# Patient Record
Sex: FEMALE | Race: WHITE | NOT HISPANIC OR LATINO | URBAN - METROPOLITAN AREA
[De-identification: names, ages, dates, MRNs, and addresses within clinical notes are randomized per-mention and may not be internally consistent; named-entity substitution may affect disease eponyms.]

---

## 2024-01-31 ENCOUNTER — APPOINTMENT (OUTPATIENT)
Dept: INTEGRATIVE MEDICINE | Facility: CLINIC | Age: 89
End: 2024-01-31

## 2024-03-06 ENCOUNTER — ALLIED HEALTH (OUTPATIENT)
Dept: INTEGRATIVE MEDICINE | Facility: CLINIC | Age: 89
End: 2024-03-06

## 2024-03-06 DIAGNOSIS — M25.512 SHOULDER PAIN, LEFT: Primary | ICD-10-CM

## 2024-03-06 PROCEDURE — 97139 UNLISTED THERAPEUTIC PX: CPT | Performed by: ACUPUNCTURIST

## 2024-03-06 NOTE — PROGRESS NOTES
Acupuncture Visit:     Subjective   Patient ID: Enriqueta Grier is a 89 y.o. female who presents for No chief complaint on file.  Pt reporting pain related to torn rotator cuff.  She has difficulty moving arm in all directions and is challenged by putting on sweater.  Pain is constant        Session Information  Is this acupuncture treatment being billed to the patient's insurance company: No  Visit Type: New patient         Review of Systems         Provider reviewed plan for the acupuncture session, precautions and contraindications. Patient/guardian/hospital staff has given consent to treat with full understanding of what to expect during the session. Before acupuncture began, provider explained to the patient to communicate at any time if the procedure was causing discomfort past their tolerance level. Patient agreed to advise acupuncturist. The acupuncturist counseled the patient on the risks of acupuncture treatment including pain, infection, bleeding, and no relief of pain. The patient was positioned comfortably. There was no evidence of infection at the site of needle insertions.    Objective   Physical Exam         Treatment Plan  Treatment Goals: Pain management    Acupuncture Treatment  Patient Position: Seated and reclining  Acupuncture Needling: Yes  Needle Guage: 42 guage /.14/ Lime green seirin, 40 guage /.16/ Red seirin, 36 guage /.20/ Blue seirin  Body Points: With retention  Body Points - Left: dlpfcx1, km neck and low back pt, k 7, st qi ix2, li 4,  sj 14, li 15, ant deltoid, shoulder pt gb 21, sj i16, k 27  Body Points - Right: lr 3  Needle Count In: 15  Needle Count Out: 15  Needle Retention Time (min): 25 minutes  Total Face to Face Time (min): 25 minutes              Assessment/Plan

## 2024-03-13 ENCOUNTER — ALLIED HEALTH (OUTPATIENT)
Dept: INTEGRATIVE MEDICINE | Facility: CLINIC | Age: 89
End: 2024-03-13

## 2024-03-13 DIAGNOSIS — M25.512 SHOULDER PAIN, LEFT: Primary | ICD-10-CM

## 2024-03-13 PROCEDURE — 97139 UNLISTED THERAPEUTIC PX: CPT | Performed by: ACUPUNCTURIST

## 2024-03-13 NOTE — PROGRESS NOTES
Acupuncture Visit:     Subjective   Patient ID: Enriqueta Grier is a 89 y.o. female who presents for Shoulder Pain  Pt reporting pain remains in left shoulder but she has some improvement in ROM.  Previous:  Pt reporting pain related to torn rotator cuff.  She has difficulty moving arm in all directions and is challenged by putting on sweater.  Pain is constant      Session Information  Is this acupuncture treatment being billed to the patient's insurance company: No  Visit Type: Follow-up visit         Review of Systems         Provider reviewed plan for the acupuncture session, precautions and contraindications. Patient/guardian/hospital staff has given consent to treat with full understanding of what to expect during the session. Before acupuncture began, provider explained to the patient to communicate at any time if the procedure was causing discomfort past their tolerance level. Patient agreed to advise acupuncturist. The acupuncturist counseled the patient on the risks of acupuncture treatment including pain, infection, bleeding, and no relief of pain. The patient was positioned comfortably. There was no evidence of infection at the site of needle insertions.    Objective   Physical Exam         Treatment Plan  Treatment Goals: Pain management    Acupuncture Treatment  Patient Position: Seated and reclining  Acupuncture Needling: Yes  Needle Guage: 40 guage /.16/ Red seirin, 36 guage /.20/ Blue seirin  Body Points: With retention  Body Points - Left: li 15, sj 14, shoulder pt, deltoid x3, gb 21  Body Points - Bilateral: st qi x2, k 7, 10, 27  Needle Count In: 17  Needle Count Out: 17  Needle Retention Time (min): 25 minutes  Total Face to Face Time (min): 25 minutes              Assessment/Plan

## 2024-03-20 ENCOUNTER — ALLIED HEALTH (OUTPATIENT)
Dept: INTEGRATIVE MEDICINE | Facility: CLINIC | Age: 89
End: 2024-03-20

## 2024-03-20 DIAGNOSIS — M25.512 SHOULDER PAIN, LEFT: Primary | ICD-10-CM

## 2024-03-20 PROCEDURE — 97139 UNLISTED THERAPEUTIC PX: CPT | Performed by: ACUPUNCTURIST

## 2024-03-20 NOTE — PROGRESS NOTES
Acupuncture Visit:     Subjective   Patient ID: Enriqueta Grier is a 89 y.o. female who presents for Shoulder Pain  Pt reporting that ROM continues to improve and pain has improved.  Previous:  Pt reporting pain remains in left shoulder but she has some improvement in ROM.                    Review of Systems         Provider reviewed plan for the acupuncture session, precautions and contraindications. Patient/guardian/hospital staff has given consent to treat with full understanding of what to expect during the session. Before acupuncture began, provider explained to the patient to communicate at any time if the procedure was causing discomfort past their tolerance level. Patient agreed to advise acupuncturist. The acupuncturist counseled the patient on the risks of acupuncture treatment including pain, infection, bleeding, and no relief of pain. The patient was positioned comfortably. There was no evidence of infection at the site of needle insertions.    Objective   Physical Exam         Treatment Plan  Treatment Goals: Pain management    Acupuncture Treatment  Patient Position: Seated and reclining  Acupuncture Needling: Yes  Needle Guage: 40 guage /.16/ Red seirin, 36 guage /.20/ Blue seirin  Body Points - Left: li 15, sj 14, shoulder pt, deltoid x2, gb 21  Body Points - Bilateral: st qi x2, k 7, 10, 27  Needle Count In: 16  Needle Count Out: 16  Needle Retention Time (min): 25 minutes  Total Face to Face Time (min): 25 minutes              Assessment/Plan

## 2024-03-25 ENCOUNTER — ALLIED HEALTH (OUTPATIENT)
Dept: INTEGRATIVE MEDICINE | Facility: CLINIC | Age: 89
End: 2024-03-25

## 2024-03-25 DIAGNOSIS — M25.512 SHOULDER PAIN, LEFT: Primary | ICD-10-CM

## 2024-03-25 PROCEDURE — 97139 UNLISTED THERAPEUTIC PX: CPT | Performed by: ACUPUNCTURIST

## 2024-03-25 NOTE — PROGRESS NOTES
Acupuncture Visit:     Subjective   Patient ID: Enriqueta Grier is a 89 y.o. female who presents for Shoulder Pain  Seeking continued support for left sided shoulder pain.  Noted decrease in pain and improvement in ROM     Shoulder Pain       Session Information  Visit Type: Follow-up visit         Review of Systems         Provider reviewed plan for the acupuncture session, precautions and contraindications. Patient/guardian/hospital staff has given consent to treat with full understanding of what to expect during the session. Before acupuncture began, provider explained to the patient to communicate at any time if the procedure was causing discomfort past their tolerance level. Patient agreed to advise acupuncturist. The acupuncturist counseled the patient on the risks of acupuncture treatment including pain, infection, bleeding, and no relief of pain. The patient was positioned comfortably. There was no evidence of infection at the site of needle insertions.    Objective   Physical Exam         Treatment Plan  Treatment Goals: Pain management    Acupuncture Treatment  Patient Position: Seated and reclining  Needle Guage: 40 guage /.16/ Red seirin, 36 guage /.20/ Blue seirin  Body Points - Left: li 15, sj 14, shoulder pt, deltoid x2, gb 21  Body Points - Bilateral: st qi x2, k 7, 10,  Needle Count In: 16  Needle Count Out: 16  Needle Retention Time (min): 25 minutes  Total Face to Face Time (min): 25 minutes              Assessment/Plan

## 2024-03-27 ENCOUNTER — APPOINTMENT (OUTPATIENT)
Dept: INTEGRATIVE MEDICINE | Facility: CLINIC | Age: 89
End: 2024-03-27

## 2024-04-01 ENCOUNTER — ALLIED HEALTH (OUTPATIENT)
Dept: INTEGRATIVE MEDICINE | Facility: CLINIC | Age: 89
End: 2024-04-01

## 2024-04-01 DIAGNOSIS — M25.512 SHOULDER PAIN, LEFT: Primary | ICD-10-CM

## 2024-04-01 PROCEDURE — 97139 UNLISTED THERAPEUTIC PX: CPT | Performed by: ACUPUNCTURIST

## 2024-04-01 NOTE — PROGRESS NOTES
Acupuncture Visit:     Subjective   Patient ID: Enriqueta Grier is a 89 y.o. female who presents for No chief complaint on file.  Pt seeking continued support for left sided shoulder pain.  She continues to report that ROM improves but pain level does not.  She will be following up with PCP in week.          Session Information  Is this acupuncture treatment being billed to the patient's insurance company: No  Visit Type: Follow-up visit         Review of Systems         Provider reviewed plan for the acupuncture session, precautions and contraindications. Patient/guardian/hospital staff has given consent to treat with full understanding of what to expect during the session. Before acupuncture began, provider explained to the patient to communicate at any time if the procedure was causing discomfort past their tolerance level. Patient agreed to advise acupuncturist. The acupuncturist counseled the patient on the risks of acupuncture treatment including pain, infection, bleeding, and no relief of pain. The patient was positioned comfortably. There was no evidence of infection at the site of needle insertions.    Objective   Physical Exam         Treatment Plan  Treatment Goals: Pain management    Acupuncture Treatment  Patient Position: Seated and reclining  Needle Guage: 36 guage /.20/ Blue seirin, 40 guage /.16/ Red seirin  Body Points - Left: si 3, shoulder pt li15, sj 14, biceps, k 9  Body Points - Bilateral: k3, st qix1  Body Points - Right: ub 62  Needle Count In: 12  Needle Count Out: 12  Needle Retention Time (min): 25 minutes  Total Face to Face Time (min): 25 minutes              Assessment/Plan

## 2024-04-03 ENCOUNTER — ALLIED HEALTH (OUTPATIENT)
Dept: INTEGRATIVE MEDICINE | Facility: CLINIC | Age: 89
End: 2024-04-03

## 2024-04-03 DIAGNOSIS — M25.512 SHOULDER PAIN, LEFT: Primary | ICD-10-CM

## 2024-04-03 PROCEDURE — 97139 UNLISTED THERAPEUTIC PX: CPT | Performed by: ACUPUNCTURIST

## 2024-04-03 NOTE — PROGRESS NOTES
Acupuncture Visit:     Subjective   Patient ID: Enriqueta Grier is a 89 y.o. female who presents for No chief complaint on file.  Seeking continued support left sided shoulder pain and limited ROM.  She shared that ROMcontinues to improve but  pain is still present        Session Information  Is this acupuncture treatment being billed to the patient's insurance company: No  Visit Type: Follow-up visit         Review of Systems         Provider reviewed plan for the acupuncture session, precautions and contraindications. Patient/guardian/hospital staff has given consent to treat with full understanding of what to expect during the session. Before acupuncture began, provider explained to the patient to communicate at any time if the procedure was causing discomfort past their tolerance level. Patient agreed to advise acupuncturist. The acupuncturist counseled the patient on the risks of acupuncture treatment including pain, infection, bleeding, and no relief of pain. The patient was positioned comfortably. There was no evidence of infection at the site of needle insertions.    Objective   Physical Exam         Treatment Plan  Treatment Goals: Pain management    Acupuncture Treatment  Patient Position: Seated and reclining  Needle Guage: 40 guage /.16/ Red seirin, 36 guage /.20/ Blue seirin  Body Points - Left: lr 3, si 3,li 15, sj 14 shoulder pt deltoidx2,  Body Points - Bilateral: st 36  Body Points - Right: ub 62, li 4  Needle Count In: 11  Needle Count Out: 11  Needle Retention Time (min): 25 minutes  Total Face to Face Time (min): 25 minutes              Assessment/Plan

## 2024-04-15 ENCOUNTER — APPOINTMENT (OUTPATIENT)
Dept: INTEGRATIVE MEDICINE | Facility: CLINIC | Age: 89
End: 2024-04-15

## 2024-04-22 ENCOUNTER — APPOINTMENT (OUTPATIENT)
Dept: INTEGRATIVE MEDICINE | Facility: CLINIC | Age: 89
End: 2024-04-22

## 2024-04-29 ENCOUNTER — APPOINTMENT (OUTPATIENT)
Dept: INTEGRATIVE MEDICINE | Facility: CLINIC | Age: 89
End: 2024-04-29

## 2024-09-29 ENCOUNTER — OFFICE VISIT (OUTPATIENT)
Dept: URGENT CARE | Age: 89
End: 2024-09-29
Payer: MEDICARE

## 2024-09-29 VITALS
SYSTOLIC BLOOD PRESSURE: 150 MMHG | WEIGHT: 143 LBS | HEART RATE: 82 BPM | OXYGEN SATURATION: 95 % | TEMPERATURE: 98.1 F | BODY MASS INDEX: 27.03 KG/M2 | DIASTOLIC BLOOD PRESSURE: 75 MMHG

## 2024-09-29 DIAGNOSIS — R09.89 CHEST CONGESTION: ICD-10-CM

## 2024-09-29 DIAGNOSIS — U07.1 COVID: Primary | ICD-10-CM

## 2024-09-29 DIAGNOSIS — J02.9 SORE THROAT: ICD-10-CM

## 2024-09-29 DIAGNOSIS — R05.9 COUGH, UNSPECIFIED TYPE: ICD-10-CM

## 2024-09-29 LAB
POC RAPID INFLUENZA A: NEGATIVE
POC RAPID INFLUENZA B: NEGATIVE
POC RAPID STREP: NEGATIVE
POC SARS-COV-2 AG BINAX: ABNORMAL

## 2024-09-29 PROCEDURE — 87804 INFLUENZA ASSAY W/OPTIC: CPT | Performed by: PHYSICIAN ASSISTANT

## 2024-09-29 PROCEDURE — 1157F ADVNC CARE PLAN IN RCRD: CPT | Performed by: PHYSICIAN ASSISTANT

## 2024-09-29 PROCEDURE — 99203 OFFICE O/P NEW LOW 30 MIN: CPT | Performed by: PHYSICIAN ASSISTANT

## 2024-09-29 PROCEDURE — 87811 SARS-COV-2 COVID19 W/OPTIC: CPT | Performed by: PHYSICIAN ASSISTANT

## 2024-09-29 PROCEDURE — 1160F RVW MEDS BY RX/DR IN RCRD: CPT | Performed by: PHYSICIAN ASSISTANT

## 2024-09-29 PROCEDURE — 1036F TOBACCO NON-USER: CPT | Performed by: PHYSICIAN ASSISTANT

## 2024-09-29 PROCEDURE — 1159F MED LIST DOCD IN RCRD: CPT | Performed by: PHYSICIAN ASSISTANT

## 2024-09-29 PROCEDURE — 87880 STREP A ASSAY W/OPTIC: CPT | Performed by: PHYSICIAN ASSISTANT

## 2024-09-29 RX ORDER — DULOXETIN HYDROCHLORIDE 20 MG/1
20 CAPSULE, DELAYED RELEASE ORAL DAILY
COMMUNITY

## 2024-09-29 RX ORDER — METOPROLOL SUCCINATE 25 MG/1
25 TABLET, EXTENDED RELEASE ORAL DAILY
COMMUNITY

## 2024-09-29 NOTE — PROGRESS NOTES
Subjective   Patient ID: Enriqueta Grier is a 89 y.o. female. They present today with a chief complaint of Cough (Day 4 - ), Nasal Congestion, and Chills.    History of Present Illness  HPI  Patient presents to the urgent care is a 89-year-old female with a 4-day history of sinus congestion, body aches, chills, cough, and sore throat.  Patient states she has never tested positive for COVID.  Patient states she was going to get her COVID and flu vaccine in the next few wks. patient takes Eliquis due to A-fib.  Past Medical History  Allergies as of 09/29/2024    (No Known Allergies)       (Not in a hospital admission)       History reviewed. No pertinent past medical history.    History reviewed. No pertinent surgical history.     reports that she has never smoked. She has never used smokeless tobacco. She reports that she does not drink alcohol and does not use drugs.    Review of Systems  Review of Systems     Patient denies fever, nausea, diarrhea, vomiting, rash, dizziness, shortness of breath, increased urinary frequency,peripheral edema, abdominal pain, chest pain.                          Objective    Vitals:    09/29/24 1359   BP: 150/75   Pulse: 82   Temp: 36.7 °C (98.1 °F)   SpO2: 95%   Weight: 64.9 kg (143 lb)     No LMP recorded.    Physical Exam  Appearance: Alert, oriented, cooperative, in no acute distress. Well nourished & well hydrated.    Skin: Intact, no lesions, rash, petechiae or purpura.     Eyes: Conjunctiva pink with no redness or exudates. Eyelids without lesions. No scleral icterus.     ENT: Hearing grossly intact. External inspection of ears without lesions or erythema. no nasal flaring. No cervical lymphadenopathy, posterior oropharynx mild erythema, uvula midline, no trismus, no exudates, no tripoding, no drooling, no difficulty swallowing oral secretions, no trismus.    Pulmonary: Clear bilaterally with good chest wall excursion. No rales, rhonchi or wheezing. No accessory muscle use  or stridor.    Cardiac:  No JVD. Irregular    Musculoskeletal: no pain, edema, or deformity. No cyanosis, clubbing.    Neurological: no focal findings identified.    Psychiatric: Appropriate mood and affect.    Procedures    Point of Care Test & Imaging Results from this visit  Results for orders placed or performed in visit on 09/29/24   POCT Covid-19 Rapid Antigen   Result Value Ref Range    POC [place]-COV-2 AG Positive test for SARS-CoV-2 (antigen detected) (A) Presumptive negative test for SARS-CoV-2 (no antigen detected)   POCT Influenza A/B manually resulted   Result Value Ref Range    POC Rapid Influenza A Negative Negative    POC Rapid Influenza B Negative Negative   POCT rapid strep A manually resulted   Result Value Ref Range    POC Rapid Strep Negative Negative      No results found.    Diagnostic study results (if any) were reviewed by Mariah Richter PA-C.    Assessment/Plan   Allergies, medications, history, and pertinent labs/EKGs/Imaging reviewed by Mariah Richter PA-C.     Medical Decision Making  Considerations for patient's symptoms include viral/bacterial infection, seasonal allergies, post-nasal drip.   Rapid COVID test positive  Rapid Flu and Strep test negative.   Patient will continue symptomatic treatment, discussed quarantine guidelines. If symptoms are not improving or if they are worsening the patient will call their primary care physician and go to the ER. Patient verbalizes understanding and agrees with plan of care. All questions were answered.  Dictation software was used in the creation of this note which does not evaluate or correct for typographical, spelling, syntax or grammatical errors.     Orders and Diagnoses  Diagnoses and all orders for this visit:  COVID  Chest congestion  -     POCT Covid-19 Rapid Antigen  -     POCT Influenza A/B manually resulted  -     POCT rapid strep A manually resulted  Cough, unspecified type  -     POCT Covid-19 Rapid Antigen  -     POCT  Influenza A/B manually resulted  -     POCT rapid strep A manually resulted  Sore throat      Medical Admin Record      Patient disposition: Home    Electronically signed by Mariah Richter PA-C  2:28 PM

## 2024-09-29 NOTE — PATIENT INSTRUCTIONS
Home Care:   1. Take medication as prescribed by your primary care physician.   2.  You were tested for Covid, positive  3. Use saline mist for sinus congestion. Tylenol or Ibuprofen for fever, and pain.  4. You need to quarantine for 5 days from symptom onset, if significantly improved on day 6, and fever free for 24 hours without fever reducing medications, you may end quarantining but wear a mask for an additional 5 days.    Call your doctor, 911 or go to the emergency department if worse or:   1. If you have wheezing or feel short of breath.  2. Breathing is difficult or too fast.   3. There are retractions (the skin between or under the ribs sucks in when breathing).   4. Chest pain occurs.   5. Drowsy or sleepy.   6. Pale color or blue/gray color is seen in the lips or fingernails (call 911).   7. Drooling or difficulty swallowing or handling saliva.   8. Fever (temperature greater than 102°F [39°C]) occurs.   9. There is blood in the stool (poop) or diarrhea or if the stool (poop) is black.   10. Lots of diarrhea occurs for more than 3 days.   11. Lots of vomiting occurs or the vomit is bloody or green or looks like chocolate or coffee.   12. Symptoms of dehydration occurs (eg, inside of the mouth looks sticky, urinating less, weakness, tiredness, pale color, eyes look hollow or sunken).   13. If you have worsening abdominal pain.  14. Unable to eat or drink.